# Patient Record
Sex: FEMALE | Race: WHITE
[De-identification: names, ages, dates, MRNs, and addresses within clinical notes are randomized per-mention and may not be internally consistent; named-entity substitution may affect disease eponyms.]

---

## 2017-11-28 ENCOUNTER — HOSPITAL ENCOUNTER (EMERGENCY)
Dept: HOSPITAL 63 - ER | Age: 42
Discharge: HOME | End: 2017-11-28
Payer: SELF-PAY

## 2017-11-28 VITALS — HEIGHT: 68 IN | BODY MASS INDEX: 28.04 KG/M2 | WEIGHT: 185 LBS

## 2017-11-28 VITALS — SYSTOLIC BLOOD PRESSURE: 118 MMHG | DIASTOLIC BLOOD PRESSURE: 97 MMHG

## 2017-11-28 DIAGNOSIS — Z20.2: ICD-10-CM

## 2017-11-28 DIAGNOSIS — R19.7: ICD-10-CM

## 2017-11-28 DIAGNOSIS — R10.32: Primary | ICD-10-CM

## 2017-11-28 LAB
ALBUMIN SERPL-MCNC: 3.8 G/DL (ref 3.4–5)
ALBUMIN/GLOB SERPL: 1.2 {RATIO} (ref 1–1.7)
ALP SERPL-CCNC: 73 U/L (ref 46–116)
ALT SERPL-CCNC: 23 U/L (ref 14–59)
ANION GAP SERPL CALC-SCNC: 8 MMOL/L (ref 6–14)
APTT PPP: (no result) S
AST SERPL-CCNC: 19 U/L (ref 15–37)
BACTERIA #/AREA URNS HPF: 0 /HPF
BASOPHILS # BLD AUTO: 0 X10^3/UL (ref 0–0.2)
BASOPHILS NFR BLD: 0 % (ref 0–3)
BILIRUB SERPL-MCNC: 0.3 MG/DL (ref 0.2–1)
BILIRUB UR QL STRIP: (no result)
BUN/CREAT SERPL: 13 (ref 6–20)
CA-I SERPL ISE-MCNC: 9 MG/DL (ref 7–20)
CALCIUM SERPL-MCNC: 8.1 MG/DL (ref 8.5–10.1)
CHLORIDE SERPL-SCNC: 106 MMOL/L (ref 98–107)
CO2 SERPL-SCNC: 25 MMOL/L (ref 21–32)
CREAT SERPL-MCNC: 0.7 MG/DL (ref 0.6–1)
EOSINOPHIL NFR BLD: 0 % (ref 0–3)
EOSINOPHIL NFR BLD: 0.1 X10^3/UL (ref 0–0.7)
ERYTHROCYTE [DISTWIDTH] IN BLOOD BY AUTOMATED COUNT: 13.5 % (ref 11.5–14.5)
FIBRINOGEN PPP-MCNC: (no result) MG/DL
GFR SERPLBLD BASED ON 1.73 SQ M-ARVRAT: 93.2 ML/MIN
GLOBULIN SER-MCNC: 3.3 G/DL (ref 2.2–3.8)
GLUCOSE SERPL-MCNC: 103 MG/DL (ref 70–99)
GLUCOSE UR STRIP-MCNC: (no result) MG/DL
HCT VFR BLD CALC: 43.4 % (ref 36–47)
HGB BLD-MCNC: 15.2 G/DL (ref 12–15.5)
LIPASE: 112 U/L (ref 73–393)
LYMPHOCYTES # BLD: 1.4 X10^3/UL (ref 1–4.8)
LYMPHOCYTES NFR BLD AUTO: 12 % (ref 24–48)
MCH RBC QN AUTO: 32 PG (ref 25–35)
MCHC RBC AUTO-ENTMCNC: 35 G/DL (ref 31–37)
MCV RBC AUTO: 91 FL (ref 79–100)
MONO #: 0.4 X10^3/UL (ref 0–1.1)
MONOCYTES NFR BLD: 4 % (ref 0–9)
NEUT #: 9.5 X10^3UL (ref 1.8–7.7)
NEUTROPHILS NFR BLD AUTO: 83 % (ref 31–73)
NITRITE UR QL STRIP: (no result)
PLATELET # BLD AUTO: 171 X10^3/UL (ref 140–400)
POTASSIUM SERPL-SCNC: 4.1 MMOL/L (ref 3.5–5.1)
PROT SERPL-MCNC: 7.1 G/DL (ref 6.4–8.2)
RBC # BLD AUTO: 4.79 X10^6/UL (ref 3.5–5.4)
RBC #/AREA URNS HPF: (no result) /HPF (ref 0–2)
SODIUM SERPL-SCNC: 139 MMOL/L (ref 136–145)
SP GR UR STRIP: <=1.005
SQUAMOUS #/AREA URNS LPF: (no result) /LPF
U PREG PATIENT: NEGATIVE
UROBILINOGEN UR-MCNC: 0.2 MG/DL
WBC # BLD AUTO: 11.4 X10^3/UL (ref 4–11)
WBC #/AREA URNS HPF: (no result) /HPF (ref 0–4)

## 2017-11-28 PROCEDURE — 96374 THER/PROPH/DIAG INJ IV PUSH: CPT

## 2017-11-28 PROCEDURE — 96375 TX/PRO/DX INJ NEW DRUG ADDON: CPT

## 2017-11-28 PROCEDURE — 87491 CHLMYD TRACH DNA AMP PROBE: CPT

## 2017-11-28 PROCEDURE — 81025 URINE PREGNANCY TEST: CPT

## 2017-11-28 PROCEDURE — 80053 COMPREHEN METABOLIC PANEL: CPT

## 2017-11-28 PROCEDURE — 81001 URINALYSIS AUTO W/SCOPE: CPT

## 2017-11-28 PROCEDURE — 74176 CT ABD & PELVIS W/O CONTRAST: CPT

## 2017-11-28 PROCEDURE — 87086 URINE CULTURE/COLONY COUNT: CPT

## 2017-11-28 PROCEDURE — 99285 EMERGENCY DEPT VISIT HI MDM: CPT

## 2017-11-28 PROCEDURE — 36415 COLL VENOUS BLD VENIPUNCTURE: CPT

## 2017-11-28 PROCEDURE — 83690 ASSAY OF LIPASE: CPT

## 2017-11-28 PROCEDURE — 96361 HYDRATE IV INFUSION ADD-ON: CPT

## 2017-11-28 PROCEDURE — 87591 N.GONORRHOEAE DNA AMP PROB: CPT

## 2017-11-28 PROCEDURE — 85025 COMPLETE CBC W/AUTO DIFF WBC: CPT

## 2017-11-28 NOTE — PHYS DOC
Past History


Past Medical History:  Other


Past Surgical History:  Tonsillectomy, Tubal ligation


Smoking:  Greater than 1 pack/day


Alcohol Use:  Occasionally


Drug Use:  None





Adult General


Chief Complaint


Chief Complaint:  abdominal pain





HPI


HPI





Patient is a pleasant nonpregnant 41-year-old female who presents with 

abdominal pain began several days ago. Initially began as a cramping pain in 

the lower abdomen with some non-bloody nonmucoid diarrhea stool. She's had an 

issue with crampy abdominal pain that waxes and wanes in the left lower 

quadrant with some radiation to the left flank. Although she has no vaginal 

discharge no UTI symptoms, no nausea vomiting patient is also concerned that 

she may have been exposed to an STD as she discovered that her fianc has been 

cheating on her. We screened for an STD as well. She's been taking Motrin for 

her symptoms which has improved but since she was here she wanted to make sure 

that she was not having an issue with dehydration. She denies any travel 

outside the country, denies any recent antibiotic use or sick contacts with 

similar symptoms.





Review of Systems


Review of Systems





Constitutional: Denies fever or chills []


Eyes: Denies change in visual acuity, redness, or eye pain []


HENT: Denies nasal congestion or sore throat []


Respiratory: Denies cough or shortness of breath []


Cardiovascular: No additional information not addressed in HPI []


GI: sHe has had some abdominal pain without nausea or vomiting but has had 

diarrhea without blood or mucus in her stools.


: Denies dysuria or hematuria []


Musculoskeletal: Does describe some mild left flank pain


Integument: Denies rash or skin lesions []


Neurologic: Denies headache, focal weakness or sensory changes []


Endocrine: Denies polyuria or polydipsia []





All other systems were reviewed and found to be within normal limits, except as 

documented in this note.





Allergies


Allergies





Allergies








Coded Allergies Type Severity Reaction Last Updated Verified


 


  No Known Drug Allergies    13 No











Physical Exam


Physical Exam


Vital signs recorded on the chart patient within normal limits.


Constitutional: Well developed, well nourished, no acute distress, non-toxic 

appearance. []


HENT: Normocephalic, atraumatic, bilateral external ears normal, oropharynx 

moist,


Cardiovascular:Heart rate regular rhythm, no murmur []


Lungs & Thorax:  Bilateral breath sounds clear to auscultation []


Abdomen: Bowel sounds normal, soft, no tenderness, no masses, no pulsatile 

masses. 


 exam:[] External female genitalia, there is a slight thin mucoid whitish 

discharge non-malodorous there are no vaginal lesions, there is no CMT, no 

adnexal fullness or tenderness to palpation.


Skin: Warm, dry, no erythema, no rash. [] 


Back: No tenderness, no CVA tenderness. [] 


Extremities: No tenderness, no cyanosis, no clubbing, ROM intact, no edema. [] 


Neurologic: Alert and oriented X 3, normal motor function, normal sensory 

function, no focal deficits noted. []


Psychologic: Affect normal, judgement normal, mood normal. []





Current Patient Data


Lab Results





 Laboratory Tests








Test


  17


07:05


 


Urine Collection Type Unknown  


 


Urine Color Straw  


 


Urine Clarity Hazy  


 


Urine pH 6.5  


 


Urine Specific Gravity <=1.005  


 


Urine Protein


  Neg


(NEG-TRACE)


 


Urine Glucose (UA)


  Neg mg/dL


(NEG)


 


Urine Ketones (Stick)


  Neg mg/dL


(NEG)


 


Urine Blood Neg (NEG)  


 


Urine Nitrite Neg (NEG)  


 


Urine Bilirubin Neg (NEG)  


 


Urine Urobilinogen Dipstick


  0.2 mg/dL (0.2


mg/dL)


 


Urine Leukocyte Esterase Trace (NEG)  


 


Urine RBC


  Rare /HPF


(0-2)


 


Urine WBC


  Occ /HPF (0-4)


 


 


Urine Squamous Epithelial


Cells Few /LPF  


 


 


Urine Bacteria


  0 /HPF (0-FEW)


 








 Laboratory Tests








Test


  17


07:05


 


Urine Collection Type Unknown  


 


Urine Color Straw  


 


Urine Clarity Hazy  


 


Urine pH 6.5  


 


Urine Specific Gravity <=1.005  


 


Urine Protein


  Neg


(NEG-TRACE)


 


Urine Glucose (UA)


  Neg mg/dL


(NEG)


 


Urine Ketones (Stick)


  Neg mg/dL


(NEG)


 


Urine Blood Neg (NEG)  


 


Urine Nitrite Neg (NEG)  


 


Urine Bilirubin Neg (NEG)  


 


Urine Urobilinogen Dipstick


  0.2 mg/dL (0.2


mg/dL)


 


Urine Leukocyte Esterase Trace (NEG)  


 


Urine RBC


  Rare /HPF


(0-2)


 


Urine WBC


  Occ /HPF (0-4)


 


 


Urine Squamous Epithelial


Cells Few /LPF  


 


 


Urine Bacteria


  0 /HPF (0-FEW)


 











EKG


EKG


[]





Radiology/Procedures


Radiology/Procedures


[] [facility] [address], [place] [address] [phone]


 


 IMAGING REPORT





 Signed





PATIENT: SY PARMAR ACCOUNT: [id number] MRN#: [id number]


: 1975 LOCATION: ER AGE: 41


SEX: F EXAM DT: 17 ACCESSION#: [id number]


STATUS: REG ER ORD. PHYSICIAN: FRED ALEXANDRE MD 


REASON: ab pain llq


PROCEDURE: CT ABDOMEN PELVIS WO CONTRAST





CT abdomen and pelvis without contrast





Indication: Lower pelvic pain for 3 days. Since of bladder being full


abdominal pain. Diarrhea for 5 days which has now ceased.





Technique: CT abdomen and pelvis without IV contrast with multiplanar


reformats.





Comparison: None





Findings:


Heart is normal in size. No pericardial or pleural effusion. Clear lung bases.


Limited evaluation of right abdominal organs due to lack of IV contrast. The


noncontrast appearance of the liver is within normal limits. No megaly. No


radiopaque gallstones. No pericholecystic fluid or gallbladder wall


thickening. The noncontrast appearance of the pancreas is within normal


limits. There is suggestion of pancreatic divisum. Adrenal glands show no mass


lesion. No nephrolithiasis or hydronephrosis. No retroperitoneal or pelvic


adenopathy. No bowel obstruction. Normal appendix. Uterus is anteverted with a


3.8 x 4.4 cm fundal fibroid. Bilateral ovaries are visualized with follicles.


No inguinal adenopathy. No free pelvic fluid. No suspicious bony lesion.





Impression:


Limited evaluation of solid abdominal organs due to lack of IV contrast.


1. No nephrolithiasis or evidence of obstructive uropathy.


2. Fibroid uterus.


3. Suggestion of pancreatic divisum.

















PQRS Compliance Statement:





One or more of the following individualized dose reduction techniques were


utilized for this examination:


1. Automated exposure control


2. Adjustment of the mA and/or kV according to patient size


3. Use of iterative reconstruction technique





Course & Med Decision Making


Course & Med Decision Making


Pertinent Labs and Imaging studies reviewed. (See chart for details)





[] thispatient presents with left lower quadrant abdominal pain with 

diarrhea.My abdominal pain differential includes but not limited to ectopic 

pregnancy, UTI, pyonephritis, cholecystitis, cholelithiasis, pancreatitis, 

appendicitis, small bowel obstruction, large bowel obstruction, diverticulosis, 

Diverticulum, intussusception, volvulus, irritable bowel disease, Crohn's or 

ulcerative colitis, considered upon arrival





She is urine pregnancy test was negative, her analysis shows slight 

contamination with white blood cells and epithelial cells but no bacteria. She 

has a slightly elevated white count of 11,000





Patient tells me that their symptoms given during CC are improved.  We reviewed 

labs and radiology reports with patient  at bedside the time is now 8:30 AM





Dragon Disclaimer


Steve Disclaimer


This electronic medical record was generated, in whole or in part, using a 

voice recognition dictation system.





Departure


Departure:


Impression:  


 Primary Impression:  


 Abdominal pain


 Additional Impression:  


 Diarrhea


Disposition:   HOME, SELF-CARE


Condition:  STABLE


Referrals:  


PCP,NO (PCP)


Patient Instructions:  Abdominal Pain (Nonspecific), Diarrhea





Additional Instructions:  


discharge:





I've spoken with the patient and/or caregivers. I've explained the patient's 

condition, diagnosis and treatment plan based on information available to me at 

this time. I've answered the patient's and/or caregivers questions and 

addressed any concerns. The patient and/or caregivers have a good understanding 

the patient's diagnosis, condition and treatment plan as can be expected at 

this point. Vital signs have been stabilized. The patient's condition is stable 

for discharge from the emergency department.





The patient will pursue further outpatient evaluation with her primary care 

provider or other designated consulting physician as outlined in the discharge 

instructions. Patient and/or caregivers are agreeable to this plan of care and 

follow-up instructions have been explained in detail. The patient and/or 

caregivers have received these instructions in written format and expressed 

understanding of these discharge instructions. The patient and her caregivers 

are aware that if any significant change in condition or worsening of symptoms 

should prompt him to immediately return to this of the closest emergency 

department.  If an emergent department is not readily available I would 

encourage him to call 911.


Scripts


Diphenoxylate Hcl/Atropine (LOMOTIL TABLET) 1 Each Tablet


1 TAB PO QID, #20 TAB


   Prov: FRED ALEXANDRE MD         17 


Dicyclomine Hcl (BENTYL) 10 Mg Capsule


1 CAP PO TID, #15 CAP.EC


   Prov: FRED ALEXANDRE MD         17





Problem Qualifiers











FRED ALEXANDRE MD 2017 07:07

## 2017-11-28 NOTE — RAD
CT abdomen and pelvis without contrast



Indication: Lower pelvic pain for 3 days. Since of bladder being full

abdominal pain. Diarrhea for 5 days which has now ceased.



Technique: CT abdomen and pelvis without IV contrast with multiplanar

reformats.



Comparison: None



Findings:

Heart is normal in size. No pericardial or pleural effusion. Clear lung bases.

Limited evaluation of right abdominal organs due to lack of IV contrast. The

noncontrast appearance of the liver is within normal limits. No megaly. No

radiopaque gallstones. No pericholecystic fluid or gallbladder wall

thickening. The noncontrast appearance of the pancreas is within normal

limits. There is suggestion of pancreatic divisum. Adrenal glands show no mass

lesion. No nephrolithiasis or hydronephrosis. No retroperitoneal or pelvic

adenopathy. No bowel obstruction. Normal appendix. Uterus is anteverted with a

3.8 x 4.4 cm fundal fibroid. Bilateral ovaries are visualized with follicles.

No inguinal adenopathy. No free pelvic fluid. No suspicious bony lesion.



Impression:

Limited evaluation of solid abdominal organs due to lack of IV contrast.

1. No nephrolithiasis or evidence of obstructive uropathy.

2. Fibroid uterus.

3. Suggestion of pancreatic divisum.











PQRS Compliance Statement:



One or more of the following individualized dose reduction techniques were

utilized for this examination:

1. Automated exposure control

2. Adjustment of the mA and/or kV according to patient size

3. Use of iterative reconstruction technique

## 2018-06-14 ENCOUNTER — HOSPITAL ENCOUNTER (EMERGENCY)
Dept: HOSPITAL 63 - ER | Age: 43
Discharge: HOME | End: 2018-06-14
Payer: SELF-PAY

## 2018-06-14 VITALS — WEIGHT: 200 LBS | HEIGHT: 69 IN | BODY MASS INDEX: 29.62 KG/M2

## 2018-06-14 VITALS — DIASTOLIC BLOOD PRESSURE: 90 MMHG | SYSTOLIC BLOOD PRESSURE: 132 MMHG

## 2018-06-14 DIAGNOSIS — Z98.51: ICD-10-CM

## 2018-06-14 DIAGNOSIS — M51.26: Primary | ICD-10-CM

## 2018-06-14 DIAGNOSIS — F17.200: ICD-10-CM

## 2018-06-14 DIAGNOSIS — G43.909: ICD-10-CM

## 2018-06-14 PROCEDURE — 99284 EMERGENCY DEPT VISIT MOD MDM: CPT

## 2018-06-14 PROCEDURE — 72131 CT LUMBAR SPINE W/O DYE: CPT

## 2018-06-14 PROCEDURE — 96372 THER/PROPH/DIAG INJ SC/IM: CPT

## 2018-06-14 NOTE — RAD
EXAM: Lumbar spine CT without contrast.

 

HISTORY: Radiculopathy. 

 

TECHNIQUE: Computed tomographic images of the lumbar spine were obtained 

without contrast. Multiplanar reformatting was performed. 

*One or more of the following individualized dose reduction techniques 

were utilized for this examination:  

1. Automated exposure control.  

2. Adjustment of the mA and/or kV according to patient size.  

3. Use of iterative reconstruction technique.

 

COMPARISON: None.

 

FINDINGS: There is no listhesis. There is no acute or subacute fracture. 

There is degenerative endplate remodeling with associated sclerosis, 

Schmorl's node formation and spurring at L4-L5. No suspicious lytic or 

sclerotic osseous lesion is seen.

 

At L1-L2, L2-L3 and L3-L4, there is no stenosis.

 

At L4-L5, there is a disc bulge with right predominant endplate 

osteophytosis. There is minimal bilateral facet arthropathy. There is mild

right foraminal stenosis. There is minimal central canal stenosis.

 

At L5-S1, there is no stenosis.

 

IMPRESSION: 

1. Degenerative changes at L4-L5, resulting in mild right foraminal 

stenosis.

2. No acute osseous finding.

 

Electronically signed by: Roxana Long MD (6/14/2018 12:55 PM) [facility]

## 2021-11-30 ENCOUNTER — HOSPITAL ENCOUNTER (EMERGENCY)
Dept: HOSPITAL 63 - ER | Age: 46
Discharge: HOME | End: 2021-11-30
Payer: SELF-PAY

## 2021-11-30 VITALS
SYSTOLIC BLOOD PRESSURE: 121 MMHG | BODY MASS INDEX: 26.29 KG/M2 | DIASTOLIC BLOOD PRESSURE: 88 MMHG | WEIGHT: 177.47 LBS | HEIGHT: 69 IN
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
## 2021-11-30 DIAGNOSIS — S50.12XA: ICD-10-CM

## 2021-11-30 DIAGNOSIS — S20.213A: ICD-10-CM
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
## 2021-11-30 DIAGNOSIS — S50.11XA: ICD-10-CM
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
## 2021-11-30 DIAGNOSIS — Z87.891: ICD-10-CM

## 2021-11-30 DIAGNOSIS — T74.21XA: Primary | ICD-10-CM
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
## 2021-11-30 DIAGNOSIS — G43.909: ICD-10-CM

## 2021-11-30 PROCEDURE — 99281 EMR DPT VST MAYX REQ PHY/QHP: CPT
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
